# Patient Record
Sex: MALE | Race: WHITE | NOT HISPANIC OR LATINO | URBAN - METROPOLITAN AREA
[De-identification: names, ages, dates, MRNs, and addresses within clinical notes are randomized per-mention and may not be internally consistent; named-entity substitution may affect disease eponyms.]

---

## 2021-01-11 ENCOUNTER — OFFICE VISIT (OUTPATIENT)
Dept: URBAN - METROPOLITAN AREA CLINIC 70 | Facility: CLINIC | Age: 71
End: 2021-01-11
Payer: MEDICARE

## 2021-01-11 ENCOUNTER — WEB ENCOUNTER (OUTPATIENT)
Dept: URBAN - METROPOLITAN AREA CLINIC 70 | Facility: CLINIC | Age: 71
End: 2021-01-11

## 2021-01-11 VITALS
HEART RATE: 69 BPM | DIASTOLIC BLOOD PRESSURE: 70 MMHG | SYSTOLIC BLOOD PRESSURE: 109 MMHG | TEMPERATURE: 96.5 F | BODY MASS INDEX: 41.26 KG/M2 | WEIGHT: 304.6 LBS | HEIGHT: 72 IN

## 2021-01-11 DIAGNOSIS — K64.8 INTERNAL HEMORRHOIDS WITH COMPLICATION: ICD-10-CM

## 2021-01-11 DIAGNOSIS — K58.0 IRRITABLE BOWEL SYNDROME WITH DIARRHEA: ICD-10-CM

## 2021-01-11 PROBLEM — 197125005 IRRITABLE BOWEL SYNDROME WITH DIARRHEA: Status: ACTIVE | Noted: 2021-01-11

## 2021-01-11 PROCEDURE — G8482 FLU IMMUNIZE ORDER/ADMIN: HCPCS | Performed by: REGISTERED NURSE

## 2021-01-11 PROCEDURE — 3017F COLORECTAL CA SCREEN DOC REV: CPT | Performed by: REGISTERED NURSE

## 2021-01-11 PROCEDURE — G8417 CALC BMI ABV UP PARAM F/U: HCPCS | Performed by: REGISTERED NURSE

## 2021-01-11 PROCEDURE — 1036F TOBACCO NON-USER: CPT | Performed by: REGISTERED NURSE

## 2021-01-11 PROCEDURE — 99214 OFFICE O/P EST MOD 30 MIN: CPT | Performed by: REGISTERED NURSE

## 2021-01-11 PROCEDURE — G8427 DOCREV CUR MEDS BY ELIG CLIN: HCPCS | Performed by: REGISTERED NURSE

## 2021-01-11 RX ORDER — CIPROFLOXACIN HYDROCHLORIDE 250 MG/1
1 TABLET TABLET, FILM COATED ORAL
Qty: 6 | Status: ACTIVE | COMMUNITY
Start: 2021-01-11 | End: 2021-01-14

## 2021-01-11 RX ORDER — COLESEVELAM HYDROCHLORIDE 625 MG/1
TAKE 1 TABLET BY ORAL ROUTE 2 TIMES A DAY FOR 30 DAYS AS DIRECTED TABLET, FILM COATED ORAL 2
Qty: 60 | Refills: 11 | Status: ACTIVE | COMMUNITY
Start: 2020-03-13 | End: 2021-03-08

## 2021-01-11 RX ORDER — COLESEVELAM HYDROCHLORIDE 625 MG/1
TAKE 1 TABLET BY ORAL ROUTE 2 TIMES A DAY FOR 30 DAYS AS DIRECTED TABLET, FILM COATED ORAL 2
Qty: 60 TABLET | Refills: 11
Start: 2020-03-13

## 2021-01-11 RX ORDER — DICYCLOMINE HYDROCHLORIDE 10 MG/1
TAKE 1 CAPSULE (10 MG) BY ORAL ROUTE 3 TIMES PER DAY FOR 30 DAYS CAPSULE ORAL
Qty: 90 | Refills: 11 | Status: ACTIVE | COMMUNITY
Start: 2020-03-13 | End: 2021-03-08

## 2021-01-11 RX ORDER — PANTOPRAZOLE SODIUM 40 MG
TAKE 1 TABLET (40 MG) BY ORAL ROUTE 2 TIMES PER DAY FOR 30 DAYS TABLET, DELAYED RELEASE (ENTERIC COATED) ORAL 2
Qty: 60 | Refills: 11 | Status: ACTIVE | COMMUNITY
Start: 2020-03-13 | End: 2021-03-08

## 2021-01-11 NOTE — HPI-TODAY'S VISIT:
Pt reports Hx of IBS-D. Has been having worsening diarrhea recently which caused hemorrhoids to flare up. He reports swelling, pain and burning. He has taken Welchol in the past but stopped it due to constipation. He had a colonoscopy 12/18/18 with finding of hemorrhoids.

## 2021-01-11 NOTE — PHYSICAL EXAM GASTROINTESTINAL
Abdomen , soft, nontender, nondistended , no guarding or rigidity , no masses palpable , normal bowel sounds , Liver and Spleen , no hepatomegaly present , no hepatosplenomegaly , liver nontender , spleen not palpable , Rectal , normal sphincter tone , internal hemorrhoids, no rectal masses or bleeding present

## 2021-12-02 ENCOUNTER — OFFICE VISIT (OUTPATIENT)
Dept: URBAN - METROPOLITAN AREA CLINIC 70 | Facility: CLINIC | Age: 71
End: 2021-12-02
Payer: MEDICARE

## 2021-12-02 VITALS
SYSTOLIC BLOOD PRESSURE: 152 MMHG | TEMPERATURE: 97.9 F | WEIGHT: 281.2 LBS | HEIGHT: 72 IN | BODY MASS INDEX: 38.09 KG/M2 | HEART RATE: 61 BPM | DIASTOLIC BLOOD PRESSURE: 78 MMHG

## 2021-12-02 DIAGNOSIS — K64.5 EXTERNAL THROMBOSED HEMORRHOIDS: ICD-10-CM

## 2021-12-02 PROCEDURE — 99214 OFFICE O/P EST MOD 30 MIN: CPT | Performed by: REGISTERED NURSE

## 2021-12-02 RX ORDER — COLESEVELAM HYDROCHLORIDE 625 MG/1
TAKE 1 TABLET BY ORAL ROUTE 2 TIMES A DAY FOR 30 DAYS AS DIRECTED TABLET, FILM COATED ORAL 2
Qty: 60 TABLET | Refills: 11 | Status: DISCONTINUED | COMMUNITY
Start: 2020-03-13

## 2021-12-02 RX ORDER — METHOTREXATE SODIUM 2.5 MG/1
TABLET ORAL
Qty: 24 | Status: ACTIVE | COMMUNITY

## 2021-12-02 NOTE — HPI-TODAY'S VISIT:
Note from OV 1/11/21: Pt reports Hx of IBS-D. Has been having worsening diarrhea recently which caused hemorrhoids to flare up. He reports swelling, pain and burning. He has taken Welchol in the past but stopped it due to constipation. He had a colonoscopy 12/18/18 with finding of hemorrhoids. ------------------------ TODAY: Pt presents today with rectal pain x 2 days. He had some constipation about a week ago and noticed some protruding hemorrhoids after that.

## 2021-12-02 NOTE — PHYSICAL EXAM GASTROINTESTINAL
Abdomen , soft, nontender, nondistended , no guarding or rigidity , no masses palpable , normal bowel sounds , Liver and Spleen , no hepatomegaly present , liver nontender  , Rectal, no mass or bleeding present, external thrombosed hemorrhoid present

## 2021-12-29 ENCOUNTER — TELEPHONE ENCOUNTER (OUTPATIENT)
Dept: URBAN - METROPOLITAN AREA CLINIC 70 | Facility: CLINIC | Age: 71
End: 2021-12-29

## 2022-02-15 ENCOUNTER — OFFICE VISIT (OUTPATIENT)
Dept: URBAN - METROPOLITAN AREA CLINIC 70 | Facility: CLINIC | Age: 72
End: 2022-02-15

## 2022-02-15 RX ORDER — METHOTREXATE SODIUM 2.5 MG/1
TABLET ORAL
Qty: 24 | Status: ACTIVE | COMMUNITY

## 2022-02-15 NOTE — HPI-OTHER HISTORIES
Note from OV 1/11/21: Pt reports Hx of IBS-D. Has been having worsening diarrhea recently which caused hemorrhoids to flare up. He reports swelling, pain and burning. He has taken Welchol in the past but stopped it due to constipation. He had a colonoscopy 12/18/18 with finding of hemorrhoids. ------------------------ Note from OV 12/2/21: Pt presents today with rectal pain x 2 days. He had some constipation about a week ago and noticed some protruding hemorrhoids after that.

## 2022-03-03 ENCOUNTER — LAB OUTSIDE AN ENCOUNTER (OUTPATIENT)
Dept: URBAN - METROPOLITAN AREA CLINIC 70 | Facility: CLINIC | Age: 72
End: 2022-03-03

## 2022-03-03 ENCOUNTER — CLAIMS CREATED FROM THE CLAIM WINDOW (OUTPATIENT)
Dept: URBAN - METROPOLITAN AREA CLINIC 70 | Facility: CLINIC | Age: 72
End: 2022-03-03
Payer: MEDICARE

## 2022-03-03 ENCOUNTER — DASHBOARD ENCOUNTERS (OUTPATIENT)
Age: 72
End: 2022-03-03

## 2022-03-03 DIAGNOSIS — Z86.010 HISTORY OF COLON POLYPS: ICD-10-CM

## 2022-03-03 DIAGNOSIS — Z80.0 FAMILY HISTORY OF COLON CANCER IN FATHER: ICD-10-CM

## 2022-03-03 DIAGNOSIS — Z87.19 HISTORY OF DIVERTICULITIS: ICD-10-CM

## 2022-03-03 PROBLEM — 428283002: Status: ACTIVE | Noted: 2022-03-03

## 2022-03-03 PROCEDURE — 99214 OFFICE O/P EST MOD 30 MIN: CPT | Performed by: NURSE PRACTITIONER

## 2022-03-03 RX ORDER — METHOTREXATE SODIUM 2.5 MG/1
TABLET ORAL
Qty: 24 | Status: ON HOLD | COMMUNITY

## 2022-03-03 NOTE — HPI-TODAY'S VISIT:
Patient is a 72 y/o male with history of colon polyps, Fhx of colon cancer with father, and multiple episodes of diverticulitis who presents today for follow up after an episode of LLQ pain in December to which he believes was another episode of diverticulitis. He was evaluated by PCP with antibiotics given and pain now resolved, however there was no CT done at that time. He states that he has acute diarrhea with each episode of abdominal pain that resolves once pain is gone. Today he has no current GI complaints. Denies fever, CP, SOB, wt loss, abdominal pain, N/V, constipation, diarrhea, or signs of GI bleeding. Last colonoscopy 12/18/18 with findings of diverticulosis and medium internal hemorrhoids with recall in 5 years.

## 2022-04-05 ENCOUNTER — CLAIMS CREATED FROM THE CLAIM WINDOW (OUTPATIENT)
Dept: URBAN - METROPOLITAN AREA CLINIC 4 | Facility: CLINIC | Age: 72
End: 2022-04-05
Payer: MEDICARE

## 2022-04-05 ENCOUNTER — OFFICE VISIT (OUTPATIENT)
Dept: URBAN - METROPOLITAN AREA SURGERY CENTER 24 | Facility: SURGERY CENTER | Age: 72
End: 2022-04-05
Payer: MEDICARE

## 2022-04-05 DIAGNOSIS — Z86.010 ADENOMAS PERSONAL HISTORY OF COLONIC POLYPS: ICD-10-CM

## 2022-04-05 DIAGNOSIS — D12.2 ADENOMA OF ASCENDING COLON: ICD-10-CM

## 2022-04-05 DIAGNOSIS — D12.2 BENIGN NEOPLASM OF ASCENDING COLON: ICD-10-CM

## 2022-04-05 PROCEDURE — 88305 TISSUE EXAM BY PATHOLOGIST: CPT | Performed by: PATHOLOGY

## 2022-04-05 PROCEDURE — 45385 COLONOSCOPY W/LESION REMOVAL: CPT | Performed by: INTERNAL MEDICINE

## 2022-04-05 PROCEDURE — G8907 PT DOC NO EVENTS ON DISCHARG: HCPCS | Performed by: INTERNAL MEDICINE

## 2022-04-05 RX ORDER — METHOTREXATE SODIUM 2.5 MG/1
TABLET ORAL
Qty: 24 | Status: ON HOLD | COMMUNITY
